# Patient Record
Sex: MALE | Race: BLACK OR AFRICAN AMERICAN | HISPANIC OR LATINO | URBAN - METROPOLITAN AREA
[De-identification: names, ages, dates, MRNs, and addresses within clinical notes are randomized per-mention and may not be internally consistent; named-entity substitution may affect disease eponyms.]

---

## 2018-05-01 ENCOUNTER — OUTPATIENT (OUTPATIENT)
Dept: OUTPATIENT SERVICES | Facility: HOSPITAL | Age: 48
LOS: 1 days | End: 2018-05-01

## 2018-06-04 DIAGNOSIS — R69 ILLNESS, UNSPECIFIED: ICD-10-CM

## 2021-04-24 ENCOUNTER — EMERGENCY (EMERGENCY)
Facility: HOSPITAL | Age: 51
LOS: 1 days | Discharge: HOME | End: 2021-04-24
Attending: EMERGENCY MEDICINE
Payer: MEDICAID

## 2021-04-24 VITALS
SYSTOLIC BLOOD PRESSURE: 149 MMHG | OXYGEN SATURATION: 95 % | HEIGHT: 75 IN | HEART RATE: 90 BPM | RESPIRATION RATE: 16 BRPM | DIASTOLIC BLOOD PRESSURE: 102 MMHG | TEMPERATURE: 98 F | WEIGHT: 235.01 LBS

## 2021-04-24 LAB
ALBUMIN SERPL ELPH-MCNC: 4.5 G/DL — SIGNIFICANT CHANGE UP (ref 3.3–5)
ALP SERPL-CCNC: 72 U/L — SIGNIFICANT CHANGE UP (ref 40–120)
ALT FLD-CCNC: 20 U/L — SIGNIFICANT CHANGE UP (ref 10–45)
ANION GAP SERPL CALC-SCNC: 10 MMOL/L — SIGNIFICANT CHANGE UP (ref 5–17)
AST SERPL-CCNC: 17 U/L — SIGNIFICANT CHANGE UP (ref 10–40)
BASOPHILS # BLD AUTO: 0.02 K/UL — SIGNIFICANT CHANGE UP (ref 0–0.2)
BASOPHILS NFR BLD AUTO: 0.5 % — SIGNIFICANT CHANGE UP (ref 0–2)
BILIRUB SERPL-MCNC: 0.4 MG/DL — SIGNIFICANT CHANGE UP (ref 0.2–1.2)
BUN SERPL-MCNC: 15 MG/DL — SIGNIFICANT CHANGE UP (ref 7–23)
CALCIUM SERPL-MCNC: 9.1 MG/DL — SIGNIFICANT CHANGE UP (ref 8.4–10.5)
CHLORIDE SERPL-SCNC: 103 MMOL/L — SIGNIFICANT CHANGE UP (ref 96–108)
CK SERPL-CCNC: 117 U/L — SIGNIFICANT CHANGE UP (ref 30–200)
CO2 SERPL-SCNC: 27 MMOL/L — SIGNIFICANT CHANGE UP (ref 22–31)
CREAT SERPL-MCNC: 1.28 MG/DL — SIGNIFICANT CHANGE UP (ref 0.5–1.3)
EOSINOPHIL # BLD AUTO: 0.05 K/UL — SIGNIFICANT CHANGE UP (ref 0–0.5)
EOSINOPHIL NFR BLD AUTO: 1.2 % — SIGNIFICANT CHANGE UP (ref 0–6)
GLUCOSE SERPL-MCNC: 99 MG/DL — SIGNIFICANT CHANGE UP (ref 70–99)
HCT VFR BLD CALC: 43.8 % — SIGNIFICANT CHANGE UP (ref 39–50)
HGB BLD-MCNC: 13.7 G/DL — SIGNIFICANT CHANGE UP (ref 13–17)
IMM GRANULOCYTES NFR BLD AUTO: 0.2 % — SIGNIFICANT CHANGE UP (ref 0–1.5)
LYMPHOCYTES # BLD AUTO: 1.84 K/UL — SIGNIFICANT CHANGE UP (ref 1–3.3)
LYMPHOCYTES # BLD AUTO: 43.1 % — SIGNIFICANT CHANGE UP (ref 13–44)
MAGNESIUM SERPL-MCNC: 2.3 MG/DL — SIGNIFICANT CHANGE UP (ref 1.6–2.6)
MCHC RBC-ENTMCNC: 28.5 PG — SIGNIFICANT CHANGE UP (ref 27–34)
MCHC RBC-ENTMCNC: 31.3 GM/DL — LOW (ref 32–36)
MCV RBC AUTO: 91.1 FL — SIGNIFICANT CHANGE UP (ref 80–100)
MONOCYTES # BLD AUTO: 0.62 K/UL — SIGNIFICANT CHANGE UP (ref 0–0.9)
MONOCYTES NFR BLD AUTO: 14.5 % — HIGH (ref 2–14)
NEUTROPHILS # BLD AUTO: 1.73 K/UL — LOW (ref 1.8–7.4)
NEUTROPHILS NFR BLD AUTO: 40.5 % — LOW (ref 43–77)
NRBC # BLD: 0 /100 WBCS — SIGNIFICANT CHANGE UP (ref 0–0)
PHOSPHATE SERPL-MCNC: 3.2 MG/DL — SIGNIFICANT CHANGE UP (ref 2.5–4.5)
PLATELET # BLD AUTO: 128 K/UL — LOW (ref 150–400)
POTASSIUM SERPL-MCNC: 4.2 MMOL/L — SIGNIFICANT CHANGE UP (ref 3.5–5.3)
POTASSIUM SERPL-SCNC: 4.2 MMOL/L — SIGNIFICANT CHANGE UP (ref 3.5–5.3)
PROT SERPL-MCNC: 7.6 G/DL — SIGNIFICANT CHANGE UP (ref 6–8.3)
RBC # BLD: 4.81 M/UL — SIGNIFICANT CHANGE UP (ref 4.2–5.8)
RBC # FLD: 14.2 % — SIGNIFICANT CHANGE UP (ref 10.3–14.5)
SARS-COV-2 RNA SPEC QL NAA+PROBE: SIGNIFICANT CHANGE UP
SODIUM SERPL-SCNC: 140 MMOL/L — SIGNIFICANT CHANGE UP (ref 135–145)
WBC # BLD: 4.27 K/UL — SIGNIFICANT CHANGE UP (ref 3.8–10.5)
WBC # FLD AUTO: 4.27 K/UL — SIGNIFICANT CHANGE UP (ref 3.8–10.5)

## 2021-04-24 PROCEDURE — 72158 MRI LUMBAR SPINE W/O & W/DYE: CPT | Mod: 26,MG

## 2021-04-24 PROCEDURE — 72158 MRI LUMBAR SPINE W/O & W/DYE: CPT

## 2021-04-24 PROCEDURE — 82550 ASSAY OF CK (CPK): CPT

## 2021-04-24 PROCEDURE — G1004: CPT

## 2021-04-24 PROCEDURE — 80053 COMPREHEN METABOLIC PANEL: CPT

## 2021-04-24 PROCEDURE — 85025 COMPLETE CBC W/AUTO DIFF WBC: CPT

## 2021-04-24 PROCEDURE — 83735 ASSAY OF MAGNESIUM: CPT

## 2021-04-24 PROCEDURE — 84100 ASSAY OF PHOSPHORUS: CPT

## 2021-04-24 PROCEDURE — 99284 EMERGENCY DEPT VISIT MOD MDM: CPT

## 2021-04-24 PROCEDURE — U0003: CPT

## 2021-04-24 PROCEDURE — U0005: CPT

## 2021-04-24 PROCEDURE — 36000 PLACE NEEDLE IN VEIN: CPT

## 2021-04-24 PROCEDURE — 99284 EMERGENCY DEPT VISIT MOD MDM: CPT | Mod: 25

## 2021-04-24 PROCEDURE — A9585: CPT

## 2021-04-24 RX ORDER — ACETAMINOPHEN 500 MG
650 TABLET ORAL ONCE
Refills: 0 | Status: COMPLETED | OUTPATIENT
Start: 2021-04-24 | End: 2021-04-24

## 2021-04-24 RX ORDER — IBUPROFEN 200 MG
600 TABLET ORAL ONCE
Refills: 0 | Status: COMPLETED | OUTPATIENT
Start: 2021-04-24 | End: 2021-04-24

## 2021-04-24 RX ADMIN — Medication 600 MILLIGRAM(S): at 19:51

## 2021-04-24 RX ADMIN — Medication 650 MILLIGRAM(S): at 19:50

## 2021-04-24 NOTE — ED PROVIDER NOTE - PATIENT PORTAL LINK FT
You can access the FollowMyHealth Patient Portal offered by [facility] by registering at the following website: [email or web address]. By joining [employer]’s FollowMyHealth portal, you will also be able to view your health information using other applications (apps) compatible with our system.

## 2021-04-24 NOTE — ED ADULT TRIAGE NOTE - CHIEF COMPLAINT QUOTE
lower back pain radiating down bilateral legs after possible "tweaking"  denies urinary or bowel incontinence, numbness or tingling

## 2021-04-24 NOTE — ED ADULT NURSE REASSESSMENT NOTE - NS ED NURSE REASSESS COMMENT FT1
Pt resting in stretcher, a&ox3, nad, no increased wob noted, able to move all extremities and follow commands, bilateral extremity strength noted, no loss of sensation noted. Pt c/o lower back pain 6/10 that radiates down to both legs. Pt denies trauma/injury to back, numbness/tingling in legs, loss of sensation, weakness, incontinence, h/a, dizziness. pending dispo Pt resting in stretcher, a&ox3, nad, no increased wob noted, able to move all extremities and follow commands, bilateral extremity strength noted, no loss of sensation noted. Pt c/o lower back pain 6/10 that radiates down to both legs, pt reports "it feels like numbness in my legs but I still have feeling." Pt reports difficulty walking upstairs. Pt denies trauma/injury to back, falls, tingling in legs, loss of sensation, weakness, incontinence, h/a, dizziness. neuro flow sheet in chart. pending dispo

## 2021-04-24 NOTE — ED PROVIDER NOTE - CLINICAL SUMMARY MEDICAL DECISION MAKING FREE TEXT BOX
Markie Casey (MD): Acute low back pain with reported weakness and decreased sensation B/L. Atraumatic. Concern for possible early cord compression, cauda equina syndrome. Will obtain stat MRI lumbar spine and screening labs.

## 2021-04-24 NOTE — ED PROVIDER NOTE - OBJECTIVE STATEMENT
50y M w/ no significant PMHx c/o sudden onset severe generalized back pain x 2 days. Onset of pain while walking up stairs. Pain radiates to groin and down legs (described as numbness) and is worsened by movement/ambulation. Pain was severe yesterday, somewhat improved today. Pt notes that he is unable to walk up stairs upright, he has to crawl backwards. Denies F/C, tenderness, weakness. No recent fall or trauma. Took oxycodone yesterday which provided relief. Nonsmoker, no EtOH use, no drug use. NKDA. Received 2nd dose of COVID vaccine last week. No h/o infection. No surgical hx. 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

## 2021-04-24 NOTE — ED PROVIDER NOTE - NSFOLLOWUPINSTRUCTIONS_ED_ALL_ED_FT
1. No signs of emergency medical condition on today's workup.  Presumptive diagnosis made, but further evaluation may be required by your primary care doctor or specialist for a definitive diagnosis.  Therefore, follow up as directed and if symptoms change/worsen or any emergency conditions, please return to the ER.   2. Presumptive diagnosis is acute low back pain with possible nerve impingement.  Take Tylenol and Motrin every 6 hours as needed for pain.  Follow up spine surgeon and PCP for re-evaluation.    3. MRI is preliminary read.  Final read later today, if changed, will call you.

## 2021-04-24 NOTE — ED PROVIDER NOTE - CARE PROVIDER_API CALL
Black Rodriguez)  Neurosurgery  [address] [place] [address]  Phone: [phone]  Fax: [phone]  Follow Up Time: 4-6 Days

## 2021-04-24 NOTE — ED ADULT NURSE NOTE - OBJECTIVE STATEMENT
pt 51 yo male presents with back pain lower back rad to legs pt took oxycodone yesterday nothing taken today pt denies any sensory or motor loss skin warm dry afebrile no incontinence of bowel or bladder hx of previous back pain

## 2021-04-24 NOTE — ED ADULT NURSE NOTE - NSIMPLEMENTINTERV_GEN_ALL_ED
Implemented All Universal Safety Interventions:  [place] to call system. Call bell, personal items and telephone within reach. Instruct patient to call for assistance. Room bathroom lighting operational. Non-slip footwear when patient is off stretcher. Physically safe environment: no spills, clutter or unnecessary equipment. Stretcher in lowest position, wheels locked, appropriate side rails in place.

## 2021-04-25 VITALS
HEART RATE: 62 BPM | SYSTOLIC BLOOD PRESSURE: 143 MMHG | DIASTOLIC BLOOD PRESSURE: 87 MMHG | OXYGEN SATURATION: 96 % | TEMPERATURE: 98 F | RESPIRATION RATE: 15 BRPM

## 2021-04-25 NOTE — ED ADULT NURSE REASSESSMENT NOTE - NS ED NURSE REASSESS COMMENT FT1
pt resting in stretcher, a&ox3, nad, no increased wob noted, skin warm and appropriate color, able to move all extremities and follow commands, no loss of sensation noted. Pt denies pain when lying down, denies loss of sensation. neuro flow sheet in chart. pending MRI results and dispo

## 2022-03-10 ENCOUNTER — EMERGENCY (EMERGENCY)
Facility: HOSPITAL | Age: 52
LOS: 1 days | Discharge: HOME | End: 2022-03-10
Attending: EMERGENCY MEDICINE
Payer: MEDICAID

## 2022-03-10 VITALS
SYSTOLIC BLOOD PRESSURE: 152 MMHG | DIASTOLIC BLOOD PRESSURE: 106 MMHG | RESPIRATION RATE: 15 BRPM | TEMPERATURE: 98 F | OXYGEN SATURATION: 97 % | HEART RATE: 71 BPM

## 2022-03-10 VITALS
OXYGEN SATURATION: 96 % | HEIGHT: 75 IN | TEMPERATURE: 98 F | HEART RATE: 90 BPM | DIASTOLIC BLOOD PRESSURE: 105 MMHG | SYSTOLIC BLOOD PRESSURE: 161 MMHG | WEIGHT: 229.94 LBS | RESPIRATION RATE: 20 BRPM

## 2022-03-10 PROBLEM — Z78.9 OTHER SPECIFIED HEALTH STATUS: Chronic | Status: ACTIVE | Noted: 2021-04-25

## 2022-03-10 LAB
A1C WITH ESTIMATED AVERAGE GLUCOSE RESULT: 5.6 % — SIGNIFICANT CHANGE UP (ref 4–5.6)
ALBUMIN SERPL ELPH-MCNC: 4.6 G/DL — SIGNIFICANT CHANGE UP (ref 3.3–5)
ALP SERPL-CCNC: 87 U/L — SIGNIFICANT CHANGE UP (ref 40–120)
ALT FLD-CCNC: 17 U/L — SIGNIFICANT CHANGE UP (ref 10–45)
ANION GAP SERPL CALC-SCNC: 11 MMOL/L — SIGNIFICANT CHANGE UP (ref 5–17)
ANISOCYTOSIS BLD QL: SLIGHT — SIGNIFICANT CHANGE UP
AST SERPL-CCNC: 17 U/L — SIGNIFICANT CHANGE UP (ref 10–40)
BASOPHILS # BLD AUTO: 0 K/UL — SIGNIFICANT CHANGE UP (ref 0–0.2)
BASOPHILS NFR BLD AUTO: 0 % — SIGNIFICANT CHANGE UP (ref 0–2)
BILIRUB SERPL-MCNC: 0.4 MG/DL — SIGNIFICANT CHANGE UP (ref 0.2–1.2)
BUN SERPL-MCNC: 19 MG/DL — SIGNIFICANT CHANGE UP (ref 7–23)
CALCIUM SERPL-MCNC: 9.6 MG/DL — SIGNIFICANT CHANGE UP (ref 8.4–10.5)
CHLORIDE SERPL-SCNC: 102 MMOL/L — SIGNIFICANT CHANGE UP (ref 96–108)
CO2 SERPL-SCNC: 26 MMOL/L — SIGNIFICANT CHANGE UP (ref 22–31)
CREAT SERPL-MCNC: 1.26 MG/DL — SIGNIFICANT CHANGE UP (ref 0.5–1.3)
EGFR: 69 ML/MIN/1.73M2 — SIGNIFICANT CHANGE UP
EOSINOPHIL # BLD AUTO: 0 K/UL — SIGNIFICANT CHANGE UP (ref 0–0.5)
EOSINOPHIL NFR BLD AUTO: 0 % — SIGNIFICANT CHANGE UP (ref 0–6)
ESTIMATED AVERAGE GLUCOSE: 114 MG/DL — SIGNIFICANT CHANGE UP (ref 68–114)
GIANT PLATELETS BLD QL SMEAR: PRESENT — SIGNIFICANT CHANGE UP
GLUCOSE SERPL-MCNC: 117 MG/DL — HIGH (ref 70–99)
HCT VFR BLD CALC: 47.5 % — SIGNIFICANT CHANGE UP (ref 39–50)
HGB BLD-MCNC: 15.3 G/DL — SIGNIFICANT CHANGE UP (ref 13–17)
LYMPHOCYTES # BLD AUTO: 1.13 K/UL — SIGNIFICANT CHANGE UP (ref 1–3.3)
LYMPHOCYTES # BLD AUTO: 26.5 % — SIGNIFICANT CHANGE UP (ref 13–44)
MACROCYTES BLD QL: SLIGHT — SIGNIFICANT CHANGE UP
MANUAL SMEAR VERIFICATION: SIGNIFICANT CHANGE UP
MCHC RBC-ENTMCNC: 28 PG — SIGNIFICANT CHANGE UP (ref 27–34)
MCHC RBC-ENTMCNC: 32.2 GM/DL — SIGNIFICANT CHANGE UP (ref 32–36)
MCV RBC AUTO: 86.8 FL — SIGNIFICANT CHANGE UP (ref 80–100)
MONOCYTES # BLD AUTO: 0.79 K/UL — SIGNIFICANT CHANGE UP (ref 0–0.9)
MONOCYTES NFR BLD AUTO: 18.6 % — HIGH (ref 2–14)
NEUTROPHILS # BLD AUTO: 2.22 K/UL — SIGNIFICANT CHANGE UP (ref 1.8–7.4)
NEUTROPHILS NFR BLD AUTO: 52.2 % — SIGNIFICANT CHANGE UP (ref 43–77)
PLAT MORPH BLD: NORMAL — SIGNIFICANT CHANGE UP
PLATELET # BLD AUTO: 142 K/UL — LOW (ref 150–400)
PLATELET COUNT - ESTIMATE: ABNORMAL
POTASSIUM SERPL-MCNC: 4 MMOL/L — SIGNIFICANT CHANGE UP (ref 3.5–5.3)
POTASSIUM SERPL-SCNC: 4 MMOL/L — SIGNIFICANT CHANGE UP (ref 3.5–5.3)
PROT SERPL-MCNC: 8.6 G/DL — HIGH (ref 6–8.3)
RBC # BLD: 5.47 M/UL — SIGNIFICANT CHANGE UP (ref 4.2–5.8)
RBC # FLD: 13.1 % — SIGNIFICANT CHANGE UP (ref 10.3–14.5)
RBC BLD AUTO: SIGNIFICANT CHANGE UP
SMUDGE CELLS # BLD: PRESENT — SIGNIFICANT CHANGE UP
SODIUM SERPL-SCNC: 139 MMOL/L — SIGNIFICANT CHANGE UP (ref 135–145)
VARIANT LYMPHS # BLD: 2.7 % — SIGNIFICANT CHANGE UP (ref 0–6)
WBC # BLD: 4.25 K/UL — SIGNIFICANT CHANGE UP (ref 3.8–10.5)
WBC # FLD AUTO: 4.25 K/UL — SIGNIFICANT CHANGE UP (ref 3.8–10.5)

## 2022-03-10 PROCEDURE — 80053 COMPREHEN METABOLIC PANEL: CPT

## 2022-03-10 PROCEDURE — 87070 CULTURE OTHR SPECIMN AEROBIC: CPT

## 2022-03-10 PROCEDURE — 87186 SC STD MICRODIL/AGAR DIL: CPT

## 2022-03-10 PROCEDURE — 83036 HEMOGLOBIN GLYCOSYLATED A1C: CPT

## 2022-03-10 PROCEDURE — 96374 THER/PROPH/DIAG INJ IV PUSH: CPT

## 2022-03-10 PROCEDURE — 99285 EMERGENCY DEPT VISIT HI MDM: CPT

## 2022-03-10 PROCEDURE — 70491 CT SOFT TISSUE NECK W/DYE: CPT | Mod: 26,MA

## 2022-03-10 PROCEDURE — 96375 TX/PRO/DX INJ NEW DRUG ADDON: CPT

## 2022-03-10 PROCEDURE — 70491 CT SOFT TISSUE NECK W/DYE: CPT | Mod: MA

## 2022-03-10 PROCEDURE — 87077 CULTURE AEROBIC IDENTIFY: CPT

## 2022-03-10 PROCEDURE — 85025 COMPLETE CBC W/AUTO DIFF WBC: CPT

## 2022-03-10 PROCEDURE — 99284 EMERGENCY DEPT VISIT MOD MDM: CPT | Mod: 25

## 2022-03-10 PROCEDURE — 99284 EMERGENCY DEPT VISIT MOD MDM: CPT

## 2022-03-10 RX ORDER — AMPICILLIN SODIUM AND SULBACTAM SODIUM 250; 125 MG/ML; MG/ML
1.5 INJECTION, POWDER, FOR SUSPENSION INTRAMUSCULAR; INTRAVENOUS ONCE
Refills: 0 | Status: COMPLETED | OUTPATIENT
Start: 2022-03-10 | End: 2022-03-10

## 2022-03-10 RX ORDER — ACETAMINOPHEN 500 MG
1000 TABLET ORAL ONCE
Refills: 0 | Status: COMPLETED | OUTPATIENT
Start: 2022-03-10 | End: 2022-03-10

## 2022-03-10 RX ADMIN — AMPICILLIN SODIUM AND SULBACTAM SODIUM 200 GRAM(S): 250; 125 INJECTION, POWDER, FOR SUSPENSION INTRAMUSCULAR; INTRAVENOUS at 06:28

## 2022-03-10 RX ADMIN — Medication 400 MILLIGRAM(S): at 07:25

## 2022-03-10 NOTE — CONSULT NOTE ADULT - SUBJECTIVE AND OBJECTIVE BOX
CC: sore throat     HPI:  51M w/ PMHx of HTN p/w worsening sore throat four days.  Tried taking a couple Sharmaine [place] w/o relief.  +chills and subjective fevers.  Able to tolerate oral secretions.  No difficulty breathing.  Denies any CP, SOB, recent trauma.  +odynophagia.  Vaccinated for covid, no sick contacts. Report has never had this before. No history of tonsilitis       PAST MEDICAL & SURGICAL HISTORY:  No pertinent past medical history    No significant past surgical history      Allergies    No Known Allergies    Intolerances      MEDICATIONS  (STANDING):    MEDICATIONS  (PRN):      Social History: nonsmoker     Family history: no pertinent family history     ROS:   ENT: all negative except as noted in HPI   CV: denies palpitations  Pulm: denies SOB, cough, hemoptysis  GI: denies change in apetite, indigestion, n/v  : denies pertinent urinary symptoms, urgency  Neuro: denies numbness/tingling, loss of sensation  Psych: denies anxiety  MS: denies muscle weakness, instability  Heme: denies easy bruising or bleeding  Endo: denies heat/cold intolerance, excessive sweating  Vascular: denies LE edema    Vital Signs Last 24 Hrs  T(C): 36.9 (10 Mar 2022 03:30), Max: 36.9 (10 Mar 2022 03:30)  T(F): 98.4 (10 Mar 2022 03:30), Max: 98.4 (10 Mar 2022 03:30)  HR: 90 (10 Mar 2022 03:30) (90 - 90)  BP: 161/105 (10 Mar 2022 03:30) (161/105 - 161/105)  BP(mean): --  RR: 20 (10 Mar 2022 03:30) (20 - 20)  SpO2: 96% (10 Mar 2022 03:30) (96% - 96%)                          15.3   4.25  )-----------( 142      ( 10 Mar 2022 04:38 )             47.5    03-10    139  |  102  |  19  ----------------------------<  117<H>  4.0   |  26  |  1.26    Ca    9.6      10 Mar 2022 04:38    TPro  8.6<H>  /  Alb  4.6  /  TBili  0.4  /  DBili  x   /  AST  17  /  ALT  17  /  AlkPhos  87  03-10       PHYSICAL EXAM:  Gen: NAD  Skin: No rashes, bruises, or lesions  Head: Normocephalic, Atraumatic  Face: no edema, erythema, or fluctuance. Parotid glands soft without mass  Eyes: no scleral injection  Nose: Nares bilaterally patent, no discharge  Mouth: No Stridor / Drooling / Trismus.  Mucosa moist. Right tonsil with edema, erythema, exudate and cavitation. Somewhat a murky appearance. No floor of mouth swelling. No submental swelling    Neck: Flat, supple, no lymphadenopathy, trachea midline, no masses  Lymphatic: No lymphadenopathy  Resp: breathing easily, no stridor  CV: no peripheral edema/cyanosis  GI: nondistended   Peripheral vascular: no JVD or edema  Neuro: facial nerve intact, no facial droop      IMAGING/ADDITIONAL STUDIES: Pending CT

## 2022-03-10 NOTE — CONSULT NOTE ADULT - ASSESSMENT
51M w/ worsening sore throat four days. +chills and subjective fevers.  Able to tolerate oral secretions.  No difficulty breathing. On physical exam, noted with right tonsillar edema, erythema, exudate and cavitation. Somewhat a murky appearance. No floor of mouth swelling. No submental swelling

## 2022-03-10 NOTE — ED POST DISCHARGE NOTE - RESULT SUMMARY
HIV test collected in wrong tube. Per nursing staff, lab unable to run the test on the tubes they have. Called patient back to ED to have the lab test redrawn, but he prefers to have it done out patient. Discussed importance of having this done ASAP. He states he will discuss at ENT appt on Wednesday.

## 2022-03-10 NOTE — ED PROVIDER NOTE - CLINICAL SUMMARY MEDICAL DECISION MAKING FREE TEXT BOX
51M p/w sore throat since Saturday. 51M p/w sore throat since Saturday.  Exam notable for right peritonsillar fluctuance w/ purulent material c/w PTA.  No pooling of oral secretions, pt. protecting airway.  No submental swelling, no concern for Darwin's.  Will obtain labs, CT neck, consult ENT, reassess, and dispo pending results.

## 2022-03-10 NOTE — ED POST DISCHARGE NOTE - DETAILS
Abscess culture: rare klebsiella. many bacteria closely resembling arcanobacterium. susceptibilities not performed. pt DCd on augmentin -appropriate coverage for bacterial growth. appropriate care given in ED. no urgent need for callback at this time - Hans Ramos PA-C

## 2022-03-10 NOTE — ED PROVIDER NOTE - PATIENT PORTAL LINK FT
You can access the FollowMyHealth Patient Portal offered by [facility] by registering at the following website: [email or web address]. By joining [facility]’s FollowMyHealth portal, you will also be able to view your health information using other applications (apps) compatible with our system.

## 2022-03-10 NOTE — ED ADULT NURSE NOTE - OBJECTIVE STATEMENT
52y/o male A&Ox3 speaking coherently independent presents w/ sore throat since Saturday. Denies any other symptoms. Has only taken jules seltzer, no other medications. Has not been to UC or PCP. States he came to the ED because it is "not getting better." Airway in tact, denies respiratory difficulty/distress. Pain worse on swallowing and speaking. Denies medical hx. Safety and comfort measures provided. Bed locked and in lowest position, side rails up for safety. Call bell within reach.

## 2022-03-10 NOTE — ED PROVIDER NOTE - ATTENDING CONTRIBUTION TO CARE
MD Hancock:  patient seen and evaluated personally.   I agree with the History & Physical,  Impression & Plan other than what was detailed in my note.  MD Hancock  52 yo m hx of htn, presenting to ed w/ four days of sore throat, getting persistently worse, hoarse voice, no associated drooling, trismus, stridor, pos chills, no fevers no n/v, no diarrhea, no pus from throat, no swelling of neck, afebrile vitals stable, pharynx is erythematous, w/ r side beefy and swollen, with slight cavitation, no trismus, no sub mental swelling, plan for cbc, cmp, ct scan, consistent w/ pta vs adenitis , will check ct scan for phlegmon vs drainable abscess, MD Hancock:  patient seen and evaluated personally.   I agree with the History & Physical,  Impression & Plan other than what was detailed in my note.  MD Hancock  50 yo m hx of htn, presenting to ed w/ four days of sore throat, getting persistently worse, hoarse voice, no associated drooling, trismus, stridor, pos chills, no fevers no n/v, no diarrhea, no pus from throat, no swelling of neck, afebrile vitals stable, pharynx is erythematous, w/ r side beefy and swollen, with slight cavitation, no trismus, no sub mental swelling, plan for cbc, cmp, ct scan, consistent w/ tonsillitis vs pta will check ct scan for phlegmon vs drainable abscess,

## 2022-03-10 NOTE — ED PROVIDER NOTE - NSFOLLOWUPINSTRUCTIONS_ED_ALL_ED_FT
1) Follow up with ENT, Dr. Escobedo on Wednesday 3/16. The lesion is suspicious for a malignancy (cancer) and you may need a biopsy. It is very important to follow up. Please call office to make an appointment  2) Return to the ED immediately for new or worsening symptoms difficulty swallowing saliva, difficulty speaking, unable to eat/drink, not improving, fevers   3) Please continue to take any home medications as prescribed  4) Your test results from your ED visit were discussed with you prior to discharge  5) You were provided with a copy of your test results  6) The HIV test and A1C test are pending. Please follow up your results within the next few days. [place] to patient portal or call ED.   7) Please take all antibiotics as directed. Please follow all pharmacy packaging instructions and warnings.

## 2022-03-10 NOTE — ED PROVIDER NOTE - PHYSICAL EXAMINATION
GENERAL: Patient awake alert NAD.  HEENT: NC/AT, Moist mucous membranes, right peritonsillar swelling w/ exudative material, mild erythema in the left peritonsillar region.  LUNGS: CTAB, no wheezes or crackles.  CARDIAC: RRR, no m/r/g.  ABDOMEN: Soft, NT, ND, No rebound, guarding.  MSK: No pain with movement, no deformities.  NEURO: A&Ox3. Moving all extremities.  SKIN: Warm and dry. No rash.  PSYCH: Normal affect.

## 2022-03-10 NOTE — ED PROVIDER NOTE - CARE PROVIDER_API CALL
Nanda Escobedo)  Otolaryngology  [address] [place] [address], [address]  [place] [address]  Phone: [phone]  Fax: [phone]  Scheduled Appointment: 03/16/2022

## 2022-03-10 NOTE — ED PROVIDER NOTE - OBJECTIVE STATEMENT
51M w/ PMHx of HTN p/w sore throat since Saturday.  Tried taking a couple Sharmaine [place] w/o relief.  +chills and subjective fevers.  Able to tolerate oral secretions.  No difficulty breathing.  Denies any CP, SOB, recent trauma.  +odynophagia.  Vaccinated for covid, no sick contacts.

## 2022-03-10 NOTE — ED ADULT TRIAGE NOTE - CHIEF COMPLAINT QUOTE
Sore throat, chills since Saturday. T(C): 37.7 (03-03-22 @ 18:02), Max: 37.7 (03-03-22 @ 18:02)  HR: 100 (03-03-22 @ 18:02) (77 - 100)  BP: 128/78 (03-03-22 @ 18:02) (128/78 - 182/106)  RR: 17 (03-03-22 @ 11:49) (17 - 17)  SpO2: 96% (03-03-22 @ 18:02) (96% - 98%)  Wt(kg): --Vital Signs Last 24 Hrs  T(C): 37.7 (03 Mar 2022 18:02), Max: 37.7 (03 Mar 2022 18:02)  T(F): 99.8 (03 Mar 2022 18:02), Max: 99.8 (03 Mar 2022 18:02)  HR: 100 (03 Mar 2022 18:02) (77 - 100)  BP: 128/78 (03 Mar 2022 18:02) (128/78 - 182/106)  BP(mean): --  RR: 17 (03 Mar 2022 11:49) (17 - 17)  SpO2: 96% (03 Mar 2022 18:02) (96% - 98%)    PHYSICAL EXAM:  GENERAL: NAD, well-groomed, well-developed  HEAD:  Atraumatic, Normocephalic  EYES: EOMI, PERRLA, conjunctiva and sclera clear  ENMT: No tonsillar erythema, exudates, or enlargement; Moist mucous membranes, Good dentition, No lesions  NECK: Supple, No JVD, Normal thyroid  NERVOUS SYSTEM:  Alert & Oriented X3, Good concentration; Motor Strength 5/5 B/L upper and lower extremities; DTRs 2+ intact and symmetric  CHEST/LUNG: Clear to percussion bilaterally; No rales, rhonchi, wheezing, or rubs  HEART: Regular rate and rhythm; No murmurs, rubs, or gallops  ABDOMEN: Soft, Nontender, Nondistended; Bowel sounds present  EXTREMITIES:  2+ Peripheral Pulses, No clubbing, cyanosis, or edema  LYMPH: No lymphadenopathy noted  SKIN: No rashes or lesions T(C): 37.7 (03-03-22 @ 18:02), Max: 37.7 (03-03-22 @ 18:02)  HR: 100 (03-03-22 @ 18:02) (77 - 100)  BP: 128/78 (03-03-22 @ 18:02) (128/78 - 182/106)  RR: 17 (03-03-22 @ 11:49) (17 - 17)  SpO2: 96% (03-03-22 @ 18:02) (96% - 98%)  Wt(kg): --Vital Signs Last 24 Hrs  T(C): 37.7 (03 Mar 2022 18:02), Max: 37.7 (03 Mar 2022 18:02)  T(F): 99.8 (03 Mar 2022 18:02), Max: 99.8 (03 Mar 2022 18:02)  HR: 100 (03 Mar 2022 18:02) (77 - 100)  BP: 128/78 (03 Mar 2022 18:02) (128/78 - 182/106)  BP(mean): --  RR: 17 (03 Mar 2022 11:49) (17 - 17)  SpO2: 96% (03 Mar 2022 18:02) (96% - 98%)    PHYSICAL EXAM:  GENERAL: NAD, well-groomed, well-developed  HEAD:  Atraumatic, Normocephalic  EYES: EOMI, PERRLA, conjunctiva and sclera clear  ENMT: No tonsillar erythema, exudates, or enlargement; Moist mucous membranes, Good dentition, No lesions  NECK: Supple, No JVD, Normal thyroid  NERVOUS SYSTEM:  Alert & Oriented X3, Good concentration; Motor Strength 5/5 B/L upper and lower extremities; DTRs 2+ intact and symmetric  CHEST/LUNG: Clear to percussion bilaterally; No rales, rhonchi, wheezing, or rubs  HEART: Regular rate and rhythm; No murmurs, rubs, or gallops  ABDOMEN: Soft, Nontender, mildly distended, Bowel sounds present  EXTREMITIES:  2+ Peripheral Pulses, No clubbing, cyanosis, or edema  LYMPH: No lymphadenopathy noted  SKIN: No rashes or lesions

## 2022-03-12 LAB
-  AMIKACIN: SIGNIFICANT CHANGE UP
-  AMOXICILLIN/CLAVULANIC ACID: SIGNIFICANT CHANGE UP
-  AMPICILLIN/SULBACTAM: SIGNIFICANT CHANGE UP
-  AMPICILLIN: SIGNIFICANT CHANGE UP
-  AZTREONAM: SIGNIFICANT CHANGE UP
-  CEFAZOLIN: SIGNIFICANT CHANGE UP
-  CEFEPIME: SIGNIFICANT CHANGE UP
-  CEFOXITIN: SIGNIFICANT CHANGE UP
-  CEFTRIAXONE: SIGNIFICANT CHANGE UP
-  CIPROFLOXACIN: SIGNIFICANT CHANGE UP
-  ERTAPENEM: SIGNIFICANT CHANGE UP
-  GENTAMICIN: SIGNIFICANT CHANGE UP
-  IMIPENEM: SIGNIFICANT CHANGE UP
-  LEVOFLOXACIN: SIGNIFICANT CHANGE UP
-  MEROPENEM: SIGNIFICANT CHANGE UP
-  PIPERACILLIN/TAZOBACTAM: SIGNIFICANT CHANGE UP
-  TOBRAMYCIN: SIGNIFICANT CHANGE UP
-  TRIMETHOPRIM/SULFAMETHOXAZOLE: SIGNIFICANT CHANGE UP
METHOD TYPE: SIGNIFICANT CHANGE UP

## 2022-03-14 PROBLEM — Z00.00 ENCOUNTER FOR PREVENTIVE HEALTH EXAMINATION: Status: ACTIVE | Noted: 2022-03-14

## 2022-03-15 LAB
CULTURE RESULTS: SIGNIFICANT CHANGE UP
ORGANISM # SPEC MICROSCOPIC CNT: SIGNIFICANT CHANGE UP
ORGANISM # SPEC MICROSCOPIC CNT: SIGNIFICANT CHANGE UP
SPECIMEN SOURCE: SIGNIFICANT CHANGE UP

## 2022-09-12 NOTE — ED PROVIDER NOTE - ATTESTATION, MLM
Problem: PAIN - ADULT  Goal: Verbalizes/displays adequate comfort level or baseline comfort level  Description: Interventions:  - Encourage patient to monitor pain and request assistance  - Assess pain using appropriate pain scale  - Administer analgesics based on type and severity of pain and evaluate response  - Implement non-pharmacological measures as appropriate and evaluate response  - Consider cultural and social influences on pain and pain management  - Notify physician/advanced practitioner if interventions unsuccessful or patient reports new pain  Outcome: Progressing     Problem: INFECTION - ADULT  Goal: Absence or prevention of progression during hospitalization  Description: INTERVENTIONS:  - Assess and monitor for signs and symptoms of infection  - Monitor lab/diagnostic results  - Monitor all insertion sites, i e  indwelling lines, tubes, and drains  - [place] appropriate cooling/warming therapies per order  - Administer medications as ordered  - Instruct and encourage patient and family to use good hand hygiene technique  - Identify and instruct in appropriate isolation precautions for identified infection/condition  Outcome: Progressing     Problem: SAFETY ADULT  Goal: Patient will remain free of falls  Description: INTERVENTIONS:  - Educate patient/family on patient safety including physical limitations  - Instruct patient to call for assistance with activity   - Consult OT/PT to assist with strengthening/mobility   - Keep Call bell within reach  - Keep bed low and locked with side rails adjusted as appropriate  - Keep care items and personal belongings within reach  - Initiate and maintain comfort rounds  - Make Fall Risk Sign visible to staff  - Apply yellow socks and bracelet for high fall risk patients  - Consider moving patient to room near nurses station  Outcome: Progressing  Goal: Maintain or return to baseline ADL function  Description: INTERVENTIONS:  -  Assess patient's ability to carry out ADLs; assess patient's baseline for ADL function and identify physical deficits which impact ability to perform ADLs (bathing, care of mouth/teeth, toileting, grooming, dressing, etc )  - Assess/evaluate cause of self-care deficits   - Assess range of motion  - Assess patient's mobility; develop plan if impaired  - Assess patient's need for assistive devices and provide as appropriate  - Encourage maximum independence but intervene and supervise when necessary  - Involve family in performance of ADLs  - Assess for home care needs following discharge   - Consider OT consult to assist with ADL evaluation and planning for discharge  - Provide patient education as appropriate  Outcome: Progressing  Goal: Maintains/Returns to pre admission functional level  Description: INTERVENTIONS:  - Perform BMAT or MOVE assessment daily    - Set and communicate daily mobility goal to care team and patient/family/caregiver     - Collaborate with rehabilitation services on mobility goals if consulted  - Out of bed for toileting  - Record patient progress and toleration of activity level   Outcome: Progressing     Problem: DISCHARGE PLANNING  Goal: Discharge to home or other facility with appropriate resources  Description: INTERVENTIONS:  - Identify barriers to discharge w/patient and caregiver  - Arrange for needed discharge resources and transportation as appropriate  - Identify discharge learning needs (meds, wound care, etc )  - Arrange for interpretive services to assist at discharge as needed  - Refer to Case Management Department for coordinating discharge planning if the patient needs post-hospital services based on physician/advanced practitioner order or complex needs related to functional status, cognitive ability, or social support system  Outcome: Progressing     Problem: Knowledge Deficit  Goal: Patient/family/caregiver demonstrates understanding of disease process, treatment plan, medications, and discharge instructions  Description: Complete learning assessment and assess knowledge base    Interventions:  - Provide teaching at level of understanding  - Provide teaching via preferred learning methods  Outcome: Progressing     Problem: HEMATOLOGIC - ADULT  Goal: Maintains hematologic stability  Description: INTERVENTIONS  - Assess for signs and symptoms of bleeding or hemorrhage  - Monitor labs  - Administer supportive blood products/factors as ordered and appropriate  Outcome: Progressing     Problem: MUSCULOSKELETAL - ADULT  Goal: Maintain or return mobility to safest level of function  Description: INTERVENTIONS:  - Assess patient's ability to carry out ADLs; assess patient's baseline for ADL function and identify physical deficits which impact ability to perform ADLs (bathing, care of mouth/teeth, toileting, grooming, dressing, etc )  - Assess/evaluate cause of self-care deficits   - Assess range of motion  - Assess patient's mobility  - Assess patient's need for assistive devices and provide as appropriate  - Encourage maximum independence but intervene and supervise when necessary  - Involve family in performance of ADLs  - Assess for home care needs following discharge   - Consider OT consult to assist with ADL evaluation and planning for discharge  - Provide patient education as appropriate  Outcome: Progressing  Goal: Maintain proper alignment of affected body part  Description: INTERVENTIONS:  - Support, maintain and protect limb and body alignment  - Provide patient/ family with appropriate education  Outcome: Progressing     Problem: Potential for Falls  Goal: Patient will remain free of falls  Description: INTERVENTIONS:  - Educate patient/family on patient safety including physical limitations  - Instruct patient to call for assistance with activity   - Consult OT/PT to assist with strengthening/mobility   - Keep Call bell within reach  - Keep bed low and locked with side rails adjusted as appropriate  - Keep care items and personal belongings within reach  - Initiate and maintain comfort rounds  - Make Fall Risk Sign visible to staff  - Apply yellow socks and bracelet for high fall risk patients  - Consider moving patient to room near nurses station  Outcome: Progressing I have reviewed and confirmed nurses' notes for patient's medications, allergies, medical history, and surgical history.

## 2022-12-05 NOTE — ED ADULT TRIAGE NOTE - HEIGHT IN INCHES
Patient attended Phase 2 Cardiac Rehab Exercise Session. Further documentation will be scanned into the medical record upon discharge.     3

## 2024-02-01 ENCOUNTER — EMERGENCY (EMERGENCY)
Facility: HOSPITAL | Age: 54
LOS: 1 days | Discharge: HOME | End: 2024-02-01
Attending: PERSONAL EMERGENCY RESPONSE ATTENDANT
Payer: MEDICAID

## 2024-02-01 VITALS
HEART RATE: 73 BPM | RESPIRATION RATE: 20 BRPM | SYSTOLIC BLOOD PRESSURE: 167 MMHG | TEMPERATURE: 98 F | OXYGEN SATURATION: 97 % | WEIGHT: 240.08 LBS | HEIGHT: 75 IN | DIASTOLIC BLOOD PRESSURE: 112 MMHG

## 2024-02-01 VITALS
RESPIRATION RATE: 18 BRPM | OXYGEN SATURATION: 95 % | SYSTOLIC BLOOD PRESSURE: 153 MMHG | HEART RATE: 62 BPM | DIASTOLIC BLOOD PRESSURE: 93 MMHG | TEMPERATURE: 98 F

## 2024-02-01 PROCEDURE — 99284 EMERGENCY DEPT VISIT MOD MDM: CPT | Mod: 25

## 2024-02-01 PROCEDURE — 99283 EMERGENCY DEPT VISIT LOW MDM: CPT

## 2024-02-01 RX ORDER — LIDOCAINE 4 G/100G
1 CREAM TOPICAL ONCE
Refills: 0 | Status: COMPLETED | OUTPATIENT
Start: 2024-02-01 | End: 2024-02-01

## 2024-02-01 RX ORDER — ACETAMINOPHEN 500 MG
975 TABLET ORAL ONCE
Refills: 0 | Status: COMPLETED | OUTPATIENT
Start: 2024-02-01 | End: 2024-02-01

## 2024-02-01 RX ORDER — DIAZEPAM 5 MG
5 TABLET ORAL ONCE
Refills: 0 | Status: DISCONTINUED | OUTPATIENT
Start: 2024-02-01 | End: 2024-02-01

## 2024-02-01 RX ORDER — IBUPROFEN 200 MG
600 TABLET ORAL ONCE
Refills: 0 | Status: COMPLETED | OUTPATIENT
Start: 2024-02-01 | End: 2024-02-01

## 2024-02-01 RX ORDER — DIAZEPAM 5 MG
1 TABLET ORAL
Qty: 6 | Refills: 0
Start: 2024-02-01 | End: 2024-02-02

## 2024-02-01 RX ADMIN — LIDOCAINE 1 PATCH: 4 CREAM TOPICAL at 08:39

## 2024-02-01 RX ADMIN — Medication 5 MILLIGRAM(S): at 08:39

## 2024-02-01 RX ADMIN — Medication 975 MILLIGRAM(S): at 08:39

## 2024-02-01 RX ADMIN — Medication 600 MILLIGRAM(S): at 08:39

## 2024-02-01 NOTE — ED PROVIDER NOTE - ATTENDING APP SHARED VISIT CONTRIBUTION OF CARE
Attending MD Gaston:  I performed a history and physical exam of the patient and discussed their management with the PA. I reviewed the PA's note and agree with the documented findings and plan of care. My medical decision making and observations are found above.

## 2024-02-01 NOTE — ED ADULT NURSE NOTE - NSFALLUNIVINTERV_ED_ALL_ED
Bed/Stretcher in lowest position, wheels locked, appropriate side rails in place/Call bell, personal items and telephone in reach/Instruct patient to call for assistance before getting out of bed/chair/stretcher/Non-slip footwear applied when patient is off stretcher/[place] to call system/Physically safe environment - no spills, clutter or unnecessary equipment/Purposeful proactive rounding/Room/bathroom lighting operational, light cord in reach

## 2024-02-01 NOTE — ED ADULT NURSE NOTE - NSFALLRISKFACTORS_ED_ALL_ED
It was a pleasure seeing you today.    Your chest x-ray was clear, no signs of a bacterial pneumonia.  This should improve with time and supportive care.    I have sent to your pharmacy prescriptions for:  1. Albuterol inhaler, use as needed for chest tightness, can help with chest congestion/mucus as well  2. Guafenesin + codeine cough medicine, as needed, preferably at night, no driving after taking this    Follow-up with Dr. Meyer as planned in ~5 months.    Shayna Madison MD  Pulmonary and Critical Care Medicine  [facility]  Office: [phone]  Pager: [phone]     No indicators present

## 2024-02-01 NOTE — ED PROVIDER NOTE - OBJECTIVE STATEMENT
52yo M with PMH of HTN presenting with back pain x 4 days. Reports pain began while bowling 3 days ago, felt immediate discomfort when he threw bowling ball. No fall or direct trauma to back. Reports he has had pain diffusely to lower back, and when he gets up it radiates down b/l legs R >L down to his knees. Reports pain is worse when he tries to stand up straight, feels like he has to lean to R side. Has tried taking aleve, advil, meloxicam, and salonpas patch with no relief. Reports he has had 54yo M with PMH of HTN presenting with back pain x 4 days. Reports pain began while bowling 3 days ago, felt immediate discomfort when he threw bowling ball. No fall or direct trauma to back. Reports he has had pain diffusely to lower back, and when he gets up it radiates down b/l legs R >L down to his knees. Reports pain is worse when he tries to stand up straight, feels like he has to lean to R side. Has tried taking aleve, advil, meloxicam, and salonpas patch with no relief. Reports he has had back pain in the past but never lasted several days. Denies any fever/chills, CP, SOB, abdominal pain, n/v, LE numbness/tingling, saddle anesthesia, bowel/bladder dysfunction, urinary symptoms.

## 2024-02-01 NOTE — ED ADULT NURSE NOTE - OBJECTIVE STATEMENT
52 y/o M A&Ox4 w/ PMH of HTN and chronic back pain presents to ED complaining of back pain. Pt states that he went bowling on Monday and has been experiencing lower back pain since. Pt states this is not unusual for him, that he typically gets back pain and it resolves on its own but he has noticed that his pain has been happening more frequently and can "happen after a sneeze." Pt endorses pain radiating through the groin, down the right leg and endorses "some numbness in the hand." Pt has been able to ambulate independently but states he is "unable to stand up completely straight." Pt describes pain as an "electric jolt." Pt denies any additional trauma. Pt is well appearing and VSS at this time. Pt in no acute distress.

## 2024-02-01 NOTE — ED PROVIDER NOTE - PATIENT PORTAL LINK FT
You can access the FollowMyHealth Patient Portal offered by [facility] by registering at the following website: [email or web address]. By joining [employer]’s FollowMyHealth portal, you will also be able to view your health information using other applications (apps) compatible with our system.

## 2024-02-01 NOTE — ED PROVIDER NOTE - MUSCULOSKELETAL NECK EXAM
no midline or paraspinal ttp throughout/no deformity, pain or tenderness. no restriction of movement

## 2024-02-01 NOTE — ED PROVIDER NOTE - NSFOLLOWUPINSTRUCTIONS_ED_ALL_ED_FT
Rest. No heavy lifting. Apply warm compresses to area for comfort as needed.     Take Ibuprofen (i.e. Motrin, Advil) 600mg every 8 hrs for pain as needed. Take with food.   May alternate with Acetaminophen (Tylenol) 650mg every 6 hours for pain as needed.     May keep lidoderm patch on for up to 12 hours; do not use more than 1 in 24 hour period. Over the Counter patches: Salonpas    Take Valium every 8 hours as needed for muscle spasm *** caution SIDE EFFECT of drowsiness - DO NOT drive or drink alcohol while taking this medication.     Follow up with the [facility] by calling [phone].     Follow up with your Primary Care Provider upon discharge.     Return to ER for any worsening pain, weakness, numbness, bowel or urinary incontinence or retention,  unable to walk, or any other concerning symptoms.

## 2024-02-01 NOTE — ED PROVIDER NOTE - CLINICAL SUMMARY MEDICAL DECISION MAKING FREE TEXT BOX
Attending MD Gaston.  Agree with above.  Pt is a 52 yo male with pmhx of HTN who presents with complaint of 3 day back pain s/p bowling on Monday during which noted 'back gave out' now constant back pain radiating to R>L knee.  Unable to stand erect 2/2 pain.  Favors supine position 2/2 improvement in some of radiating pain.  Has attempted ibu profen, advil, aleve, lido patch and meloxicam.  Last dose 10 pm last night 2 doses aleve + lido patch.  Has hx of similar pain for which he used to see chiropractor but pain used to resolve in 1 day, now lasting 3 days.  No assoc weakness/numbness/tingling.  No preceding trauma, no loss of bowel/bladder control.  No midline spinal TTP/stepoffs. Attending MD Gaston.  Agree with above.  Pt is a 54 yo male with pmhx of HTN who presents with complaint of 3 day back pain s/p bowling on Monday during which noted 'back gave out' now constant back pain radiating to R>L knee.  Unable to stand erect 2/2 pain.  Favors supine position 2/2 improvement in some of radiating pain.  Has attempted ibu profen, advil, aleve, lido patch and meloxicam.  Last dose 10 pm last night 2 doses aleve + lido patch.  Has hx of similar pain for which he used to see chiropractor but pain used to resolve in 1 day, now lasting 3 days.  No assoc weakness/numbness/tingling.  No preceding trauma, no loss of bowel/bladder control.  No midline spinal TTP/stepoffs.    Prolonged discussion with pt re: need to return to ED for any fevers/chills, loss of bowel/bladder control, development of numbness of perineum and genitals, development of LE weakness.  None of these features are currently present.  Pt referred to outpt spinal service for ongoing management.  He verbalizes understanding of above return precautions as well as recommendation that he follow-up for ongoing management.  Pt stable for discharge home.

## 2024-04-22 NOTE — ED ADULT TRIAGE NOTE - BMI (KG/M2)
Caller: Mina Romero    Relationship: Self    Best call back number: [phone]     What orders are you requesting (i.e. lab or imaging): PHYSICAL THERAPY     In what timeframe would the patient need to come in: AS SOON AS POSSIBLE     Where will you receive your lab/imaging services: PHYSICAL THERAPY/ [address]     Additional notes: PATIENT IS REQUESTING A CALL BACK       
PHYSICAL THERAPY HAS BEEN TRYING TO CONTACT PATIENT. PATIENT CAN CALL [phone] TO SCHEDULE PT APPT. (HUB TO READ)  
30